# Patient Record
Sex: FEMALE | Race: WHITE
[De-identification: names, ages, dates, MRNs, and addresses within clinical notes are randomized per-mention and may not be internally consistent; named-entity substitution may affect disease eponyms.]

---

## 2018-08-21 ENCOUNTER — HOSPITAL ENCOUNTER (OUTPATIENT)
Dept: HOSPITAL 68 - STS | Age: 48
End: 2018-08-21
Attending: PODIATRIST
Payer: COMMERCIAL

## 2018-08-21 VITALS — HEIGHT: 64 IN | WEIGHT: 243 LBS | BODY MASS INDEX: 41.48 KG/M2

## 2018-08-21 DIAGNOSIS — M20.5X1: ICD-10-CM

## 2018-08-21 DIAGNOSIS — Z79.84: ICD-10-CM

## 2018-08-21 DIAGNOSIS — M20.21: Primary | ICD-10-CM

## 2018-08-21 DIAGNOSIS — E11.9: ICD-10-CM

## 2018-08-21 DIAGNOSIS — I10: ICD-10-CM

## 2018-08-21 NOTE — HISTORY & PHYSICAL PRE-OP
General Information and HPI
History of Present Illness:
Mary Jane is a 48-year-old female with a long-standing and worsening complaint of 
painful hallux limitus right foot.  The patient has undergone an extended course
of conservative care, including shoe gear and activity modification, rest, 
immobilization and course of NSAIDs.  None of this has yielded her any 
significant relief.  The patient presents today for preoperative surgical 
consultation.
 
Allergies/Medications
Allergies:
Coded Allergies:
Fish Containing Products (anaphylaxis 08/17/18)
Uncoded Allergies:
SHELL FISH (anaphylaxis 08/17/18)
 
 
Past History
 
Medical History
Cardiovascular: hypertension
Endocrine: diabetes
 
Surgical History
Pertinent Surgical History: spinal fusion
 
Review of Systems
Review of Systems:
Unremarkable except for that noted in history present illness
 
Exam & Diagnostic Data
Physical Exam:
Lungs clear bilaterally.  Heart sounds rate and rhythm regular.  Lower extremity
physical exam demonstrates intact pedal pulses bilaterally.  Both the dorsalis 
pedis and posterior tibial arteries are palpable bilaterally.  Patient without 
any sensorimotor deficits.  Deep tendon reflexes are grossly intact.  Patient is
noted to have pain with palpation or range of motion to the right first 
metatarsophalangeal joint.  Range of motion is noted to be diminished 
particularly dorsiflexion.  No crepitus identified.
 
Assessment/Plan
Assessment/Plan:
Hallux limitus right foot.  A lengthy discussion reviewing both surgical and 
conservative options with the patient at bedside and the patient elected to go 
forward with surgery despite the risks.
 
As Ranked By This Provider
Problem List:
 1. Hallux rigidus of right foot
 
 
Attending MD Review Statement
 
Attending Statement
Attending MD Statement: examined this patient

## 2018-08-21 NOTE — OPERATIVE REPORT
Operative/Inv Procedure Report
Surgery Date: 08/21/18
Name of Procedure:
1 cheilectomy right first metatarsophalangeal
2 intraoperative menstruation Francisco block anesthesia
Pre-Operative Diagnosis:
1 hallux limitus right
Post-Operative Diagnosis:
The same
Estimated Blood Loss: scant
Surgeon/Assistant:
Que Goodman DPM
 
Anesthesia: moderate sedation, block
 
Operative/Procedure Note
Note:
After obtaining informed consent the patient was brought to the operating room 
placed on the operating table in the supine position.  The patient was then 
securely fastened to the operating table utilizing a safety belt.  After 
menstruation of IV sedation, 10 cc of 0.5% Marcaine plain was evaluated by the 
patient's right ankle.  A well-padded ankle tourniquet was placed about the 
patient's right lower extremity.  2 g of Ancef were delivered intravenously 1 
dose.  The right foot was then scrubbed, prepped and draped in the usual aseptic
manner.  The right lower extremity is elevated to exsanguinate the limb, at 
which point the ankle tourniquet inflated 250 mmHg.  Attention to the dorsal 
aspect of the right foot, where a 6 cm linear incision made just medial to the 
course of the extensor hallucis longus tendon.  The dissection was then carried 
down septations tissues, were all vital neurovascular structures were identified
and protected.  A linear capsulotomy was then performed exposing the medial and 
dorsal exostoses.  These were then removed with a sagittal bone saw.  Any rough 
edges identified and the margins were smooth.  Inspection of the 
metatarsophalangeal joint demonstrated intact cartilaginous surfaces on the head
metatarsal and the base of proximal phalanx.  The open wound was then irrigated,
spots in normal sterile saline.  The capsular structures reapproximated 3-0 
Vicryl and the septations was to be approximately 4-0 Vicryl.  The skin is 
approximate 4-0 Monocryl.  The incision was dressed with Steri-Strips, Xeroform,
4 x 4's, Kerlix and Ace wrap.  The patient noted tolerate both procedure and 
anesthesia well and the patient was transferred to the operating room to 
recovery device in stable vessel status intact all digits right foot.